# Patient Record
Sex: FEMALE | Race: WHITE | NOT HISPANIC OR LATINO | Employment: PART TIME | URBAN - METROPOLITAN AREA
[De-identification: names, ages, dates, MRNs, and addresses within clinical notes are randomized per-mention and may not be internally consistent; named-entity substitution may affect disease eponyms.]

---

## 2017-02-06 DIAGNOSIS — O28.3 ECHOGENIC INTRACARDIAC FOCUS OF FETUS ON PRENATAL ULTRASOUND: Primary | ICD-10-CM

## 2017-02-08 ENCOUNTER — HOSPITAL ENCOUNTER (OUTPATIENT)
Dept: WOMENS IMAGING | Facility: HOSPITAL | Age: 22
Discharge: HOME | End: 2017-02-08
Attending: OBSTETRICS & GYNECOLOGY | Admitting: OBSTETRICS & GYNECOLOGY

## 2017-02-08 ENCOUNTER — OFFICE VISIT (OUTPATIENT)
Dept: OBSTETRICS AND GYNECOLOGY | Facility: HOSPITAL | Age: 22
End: 2017-02-08

## 2017-02-08 VITALS — WEIGHT: 172 LBS | DIASTOLIC BLOOD PRESSURE: 79 MMHG | BODY MASS INDEX: 30.47 KG/M2 | SYSTOLIC BLOOD PRESSURE: 118 MMHG

## 2017-02-08 DIAGNOSIS — O28.3 ECHOGENIC INTRACARDIAC FOCUS OF FETUS ON PRENATAL ULTRASOUND: ICD-10-CM

## 2017-02-08 DIAGNOSIS — O35.BXX1 ECHOGENIC FOCUS OF HEART, FETAL, AFFECTING CARE OF MOTHER, ANTEPARTUM, FETUS 1: Primary | ICD-10-CM

## 2017-02-08 PROCEDURE — 76816 OB US FOLLOW-UP PER FETUS: CPT | Performed by: OBSTETRICS & GYNECOLOGY

## 2017-02-08 PROCEDURE — 76816 OB US FOLLOW-UP PER FETUS: CPT

## 2017-02-08 NOTE — PROGRESS NOTES
"Pt denies all s/s PTL, denies other problems, states\" MONISHA to DR. Dmitriy Naranjo [place], [place], declined genetic screening\"  "

## 2017-02-08 NOTE — PROGRESS NOTES
Documentation of the ultrasound findings, images, and interpretations will be available in the patient's ViewPoint report located in the chart review imaging tab in [employer].